# Patient Record
Sex: FEMALE | Race: BLACK OR AFRICAN AMERICAN | Employment: FULL TIME | URBAN - METROPOLITAN AREA
[De-identification: names, ages, dates, MRNs, and addresses within clinical notes are randomized per-mention and may not be internally consistent; named-entity substitution may affect disease eponyms.]

---

## 2023-01-13 ENCOUNTER — OFFICE VISIT (OUTPATIENT)
Dept: FAMILY MEDICINE | Facility: CLINIC | Age: 43
End: 2023-01-13
Payer: COMMERCIAL

## 2023-01-13 VITALS
HEART RATE: 89 BPM | BODY MASS INDEX: 40.98 KG/M2 | SYSTOLIC BLOOD PRESSURE: 130 MMHG | WEIGHT: 246 LBS | TEMPERATURE: 98.3 F | OXYGEN SATURATION: 99 % | DIASTOLIC BLOOD PRESSURE: 78 MMHG | HEIGHT: 65 IN | RESPIRATION RATE: 20 BRPM

## 2023-01-13 DIAGNOSIS — Z11.4 SCREENING FOR HIV (HUMAN IMMUNODEFICIENCY VIRUS): ICD-10-CM

## 2023-01-13 DIAGNOSIS — Z76.89 ENCOUNTER TO ESTABLISH CARE: Primary | ICD-10-CM

## 2023-01-13 DIAGNOSIS — K21.9 GASTROESOPHAGEAL REFLUX DISEASE WITHOUT ESOPHAGITIS: ICD-10-CM

## 2023-01-13 DIAGNOSIS — N93.9 VAGINAL SPOTTING: ICD-10-CM

## 2023-01-13 DIAGNOSIS — B96.89 BV (BACTERIAL VAGINOSIS): ICD-10-CM

## 2023-01-13 DIAGNOSIS — R35.89 POLYURIA: ICD-10-CM

## 2023-01-13 DIAGNOSIS — Z13.1 SCREENING FOR DIABETES MELLITUS: ICD-10-CM

## 2023-01-13 DIAGNOSIS — Z11.59 NEED FOR HEPATITIS C SCREENING TEST: ICD-10-CM

## 2023-01-13 DIAGNOSIS — Z11.3 SCREEN FOR STD (SEXUALLY TRANSMITTED DISEASE): ICD-10-CM

## 2023-01-13 DIAGNOSIS — Z12.4 SCREENING FOR CERVICAL CANCER: ICD-10-CM

## 2023-01-13 DIAGNOSIS — N76.0 BV (BACTERIAL VAGINOSIS): ICD-10-CM

## 2023-01-13 DIAGNOSIS — N89.8 VAGINAL DISCHARGE: ICD-10-CM

## 2023-01-13 DIAGNOSIS — Z13.220 SCREENING FOR HYPERLIPIDEMIA: ICD-10-CM

## 2023-01-13 DIAGNOSIS — Z12.31 ENCOUNTER FOR SCREENING MAMMOGRAM FOR BREAST CANCER: ICD-10-CM

## 2023-01-13 LAB
ANION GAP SERPL CALCULATED.3IONS-SCNC: 14 MMOL/L (ref 7–15)
BUN SERPL-MCNC: 8.6 MG/DL (ref 6–20)
CALCIUM SERPL-MCNC: 10 MG/DL (ref 8.6–10)
CHLORIDE SERPL-SCNC: 104 MMOL/L (ref 98–107)
CHOLEST SERPL-MCNC: 249 MG/DL
CLUE CELLS: PRESENT
CREAT SERPL-MCNC: 0.79 MG/DL (ref 0.51–0.95)
DEPRECATED HCO3 PLAS-SCNC: 21 MMOL/L (ref 22–29)
GFR SERPL CREATININE-BSD FRML MDRD: >90 ML/MIN/1.73M2
GLUCOSE SERPL-MCNC: 100 MG/DL (ref 70–99)
HBA1C MFR BLD: 5.3 % (ref 0–5.6)
HCV AB SERPL QL IA: NONREACTIVE
HDLC SERPL-MCNC: 65 MG/DL
HIV 1+2 AB+HIV1 P24 AG SERPL QL IA: NONREACTIVE
LDLC SERPL CALC-MCNC: 171 MG/DL
NONHDLC SERPL-MCNC: 184 MG/DL
POTASSIUM SERPL-SCNC: 4.4 MMOL/L (ref 3.4–5.3)
SODIUM SERPL-SCNC: 139 MMOL/L (ref 136–145)
TRICHOMONAS, WET PREP: ABNORMAL
TRIGL SERPL-MCNC: 63 MG/DL
WBC'S/HIGH POWER FIELD, WET PREP: ABNORMAL
YEAST, WET PREP: ABNORMAL

## 2023-01-13 PROCEDURE — 83036 HEMOGLOBIN GLYCOSYLATED A1C: CPT

## 2023-01-13 PROCEDURE — 87591 N.GONORRHOEAE DNA AMP PROB: CPT

## 2023-01-13 PROCEDURE — 87389 HIV-1 AG W/HIV-1&-2 AB AG IA: CPT

## 2023-01-13 PROCEDURE — 99203 OFFICE O/P NEW LOW 30 MIN: CPT

## 2023-01-13 PROCEDURE — 87491 CHLMYD TRACH DNA AMP PROBE: CPT

## 2023-01-13 PROCEDURE — 36415 COLL VENOUS BLD VENIPUNCTURE: CPT

## 2023-01-13 PROCEDURE — 80061 LIPID PANEL: CPT

## 2023-01-13 PROCEDURE — 86803 HEPATITIS C AB TEST: CPT

## 2023-01-13 PROCEDURE — 87624 HPV HI-RISK TYP POOLED RSLT: CPT

## 2023-01-13 PROCEDURE — 87210 SMEAR WET MOUNT SALINE/INK: CPT

## 2023-01-13 PROCEDURE — G0145 SCR C/V CYTO,THINLAYER,RESCR: HCPCS

## 2023-01-13 PROCEDURE — 80048 BASIC METABOLIC PNL TOTAL CA: CPT

## 2023-01-13 RX ORDER — AMOXICILLIN 500 MG/1
CAPSULE ORAL
COMMUNITY
Start: 2022-12-07

## 2023-01-13 RX ORDER — METRONIDAZOLE 500 MG/1
500 TABLET ORAL 2 TIMES DAILY
Qty: 14 TABLET | Refills: 0 | Status: SHIPPED | OUTPATIENT
Start: 2023-01-13 | End: 2023-01-20

## 2023-01-13 ASSESSMENT — ENCOUNTER SYMPTOMS
NERVOUS/ANXIOUS: 0
COUGH: 0
PARESTHESIAS: 0
WEAKNESS: 0
CHILLS: 0
DIZZINESS: 0
JOINT SWELLING: 0
HEMATOCHEZIA: 0
DIARRHEA: 0
ABDOMINAL PAIN: 0
FREQUENCY: 1
MYALGIAS: 0
SHORTNESS OF BREATH: 0
BREAST MASS: 0
HEADACHES: 0
HEARTBURN: 1
EYE PAIN: 0
NAUSEA: 0
PALPITATIONS: 0
FEVER: 0
HEMATURIA: 0
ARTHRALGIAS: 0
DYSURIA: 0
SORE THROAT: 0
CONSTIPATION: 0

## 2023-01-13 ASSESSMENT — PAIN SCALES - GENERAL: PAINLEVEL: NO PAIN (0)

## 2023-01-13 NOTE — PROGRESS NOTES
Assessment & Plan     Encounter to establish care  Patient presents to establish care after her previously living in [place].  We discussed recommended screenings today, previous health status, family history, and social history.  No previous medical diagnoses, and does not take any meds.  Currently on amoxicillin for a tooth infection that she is to have removed in 2 weeks.  This is doing much better since starting on the amoxicillin.  - Basic metabolic panel  (Ca, Cl, CO2, Creat, Gluc, K, Na, BUN)    Vaginal discharge  BV (bacterial vaginosis)  Patient reports increased white thin discharge.  Wet prep was completed today.  It does show clue cells.  Will treat for BV.  Discussed with patient to take this medication for the whole course and as prescribed.  She can take it with yogurt or probiotic to drain decrease the risk of diarrhea.  - Wet prep - Clinic Collect  - metroNIDAZOLE (FLAGYL) 500 MG tablet; Take 1 tablet (500 mg) by mouth 2 times daily for 7 days  Dispense: 14 tablet; Refill: 0    Screen for STD (sexually transmitted disease)  Patient request STD screening with her Pap smear today.  Collected.  - Chlamydia & Gonorrhea by PCR, [facility] - Clinic Collect    Screening for cervical cancer  Pap screen completed today.  Will order with HPV per her age recommendations.  She does not remember ever having anyone tell her she had a history of an abnormal Pap.  There was some blood with swabbing today, but patient reports she is just finishing up her menses.  No bleeding with scraping of the cervix.  - Pap screen with HPV - recommended age 30 - 65 years    Screening for diabetes mellitus  Polyuria  Patient would like screening for diabetes.  Will complete today.  She does have an increased BMI and notes that she thinks she was told she had high cholesterol in the past.  We will treat if elevated to the level of diabetes, and has discussed with patient about lifestyle changes versus medications if prediabetes.  -  Basic metabolic panel  (Ca, Cl, CO2, Creat, Gluc, K, Na, BUN)  - Hemoglobin A1c; 5.3, no intervention needed.    Screening for HIV (human immunodeficiency virus)  Discussed recommended screening with patient.  She agrees.  Ordered.  - HIV Antigen Antibody Combo    Screening for hyperlipidemia  Patient does think her previous PCP told her her cholesterol was high and wanted to treat.  We will recheck today.  And treat as appropriate.  - Lipid Profile (Chol, Trig, HDL, LDL calc)    Recent Labs   Lab Test 01/13/23  1209   CHOL 249*   HDL 65   *   TRIG 63     The 10-year ASCVD risk score (Jarvis CONNELL, et al., 2019) is: 0.9%    Values used to calculate the score:      Age: 42 years      Sex: Female      Is Non- : No      Diabetic: No      Tobacco smoker: No      Systolic Blood Pressure: 130 mmHg      Is BP treated: No      HDL Cholesterol: 65 mg/dL      Total Cholesterol: 249 mg/dL    Need for hepatitis C screening test  Discussed recommended screening. Patient agrees. Ordered.   - Hepatitis C Screen Reflex to HCV RNA Quant and Genotype    BMI 40.0-44.9, adult (H)  We discussed today that patient is doing a food journal and trying to lose weight with a group of her friends.  She has been trying to eat healthier and exercising.  She tells me that they have a $450 pool going on to who can lose the most weight before March.  She is very motivated to try and lose weight because of this.    Encounter for screening mammogram for breast cancer  Patient is due for mammogram per her reports.  Ordered.  - *MA Screening Digital Bilateral; Future    12. Vaginal spotting  Patient describes vaginal spotting consistently before.  Discussed with her that agree with her previous PCP that we will just continue to monitor this.  If she has spotting that is not typical and predictable would want her to be evaluated.  If she has any abdominal fullness, pelvic pain, or ovarian pain I would want her to be evaluated  "as well.  She verbalized understanding.     13. Gastroesophageal reflux disease without esophagitis  Patient suffering from heartburn, relieved by Tums.  We discussed lifestyle management with this including watching which foods cause it and she could take an omeprazole prior to prevent it, or she continue just to use Tums as needed.  Discussed not eating within 2 hours of bedtime, and not laying down immediately after eating.  As mentioned in HPI she has stopped eating after 7 PM and has had significant decrease in the amount of heartburn she is having.    BMI:   Estimated body mass index is 40.94 kg/m  as calculated from the following:    Height as of this encounter: 1.651 m (5' 5\").    Weight as of this encounter: 111.6 kg (246 lb).   Weight management plan: Discussed healthy diet and exercise guidelines    Return in about 1 year (around 1/13/2024) for Routine preventive. Or sooner if needed.     Darek Barraza is a 42 year old, presenting for the following health issues:  Physical (Establish care/ PAP )    Patient reports she moved here about 7 months ago from [place] and needs to establish care.  She does not take any medications, but is currently on amoxicillin for tooth infection.  This is scheduled to be removed in about 2 weeks.  The pain has improved and she is feeling better since starting the amoxicillin.    She does note some increased vaginal discharge.  No abnormal vaginal bleeding, but she does report she always has spotting before her period after her tubal ligation. This has been chronic and is regular for her.  Previous PCP told her just to continue to monitor.  She is sexually active and would like STD screening.  She is not having any pelvic pain.  No lower back pain.  No fevers.    She does not smoke, but she did previously quit about 10 years ago.  She drinks about 1 liquor drink every other weekend.  She is trying to eat healthier and is keeping a food journal and exercising to try and " lose weight.  Her and a few friends have a pool going on who can lose most weight before March.  She has given up soda since the first of the year and is doing well.  Reports she used to drink this consistently throughout the day.  She has 3 daughters.  She is not in a relationship, but currently only sexually active with 1 male partner.  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  She likes her job.    She notes she does have heartburn in the evenings and has to take Tums.  She is very pleased with the fact that since she has been keeping a food journal and try not to eat after 7 and the heartburn has improved immensely.    Healthy Habits:     Getting at least 3 servings of Calcium per day:  Yes    Bi-annual eye exam:  NO    Dental care twice a year:  Yes    Sleep apnea or symptoms of sleep apnea:  Excessive snoring    Diet:  Regular (no restrictions)    Frequency of exercise:  1 day/week    Duration of exercise:  15-30 minutes    Taking medications regularly:  Yes    Medication side effects:  None    PHQ-2 Total Score: 0    Additional concerns today:  Yes     Review of Systems   Constitutional: Negative for chills and fever.   HENT: Negative for congestion, ear pain, hearing loss and sore throat.    Eyes: Negative for pain and visual disturbance.   Respiratory: Negative for cough and shortness of breath.    Cardiovascular: Negative for chest pain, palpitations and peripheral edema.   Gastrointestinal: Positive for heartburn. Negative for abdominal pain, constipation, diarrhea, hematochezia and nausea.   Breasts:  Negative for tenderness, breast mass and discharge.   Genitourinary: Positive for frequency, vaginal bleeding and vaginal discharge. Negative for dysuria, genital sores, hematuria, pelvic pain and urgency.   Musculoskeletal: Negative for arthralgias, joint swelling and myalgias.   Skin: Negative for rash.   Neurological: Negative for dizziness, weakness, headaches and paresthesias.   Psychiatric/Behavioral:  "Negative for mood changes. The patient is not nervous/anxious.           Objective    /78 (BP Location: Right arm, Patient Position: Right side)   Pulse 89   Temp 98.3  F (36.8  C) (Oral)   Resp 20   Ht 1.651 m (5' 5\")   Wt 111.6 kg (246 lb)   LMP 01/08/2023 (Exact Date)   SpO2 99%   BMI 40.94 kg/m    Body mass index is 40.94 kg/m .  Physical Exam  Constitutional:       General: She is not in acute distress.  HENT:      Right Ear: Tympanic membrane, ear canal and external ear normal.      Left Ear: Tympanic membrane, ear canal and external ear normal.   Eyes:      Extraocular Movements: Extraocular movements intact.      Pupils: Pupils are equal, round, and reactive to light.   Cardiovascular:      Rate and Rhythm: Normal rate and regular rhythm.      Pulses: Normal pulses.      Heart sounds: Normal heart sounds. No murmur heard.  Pulmonary:      Effort: Pulmonary effort is normal. No respiratory distress.      Breath sounds: Normal breath sounds. No wheezing.   Abdominal:      General: Abdomen is flat. Bowel sounds are normal.      Palpations: Abdomen is soft.      Hernia: There is no hernia in the left inguinal area or right inguinal area.   Genitourinary:     Exam position: Lithotomy position.      Labia:         Right: No tenderness, lesion or injury.         Left: No tenderness, lesion or injury.       Urethra: No prolapse, urethral pain or urethral swelling.      Vagina: Vaginal discharge present. No erythema, tenderness, bleeding or lesions.      Cervix: No cervical motion tenderness, discharge, friability, lesion, erythema or cervical bleeding.      Uterus: Not deviated.       Adnexa:         Right: No mass, tenderness or fullness.          Left: No mass, tenderness or fullness.        Comments: White drainage present, scant red blood on swabs, no obvious signs of bleeding.   Musculoskeletal:         General: Normal range of motion.      Cervical back: Normal range of motion. No rigidity or " tenderness.      Right lower leg: No edema.      Left lower leg: No edema.   Lymphadenopathy:      Cervical: No cervical adenopathy.      Lower Body: No right inguinal adenopathy. No left inguinal adenopathy.   Neurological:      General: No focal deficit present.      Mental Status: She is alert.      Cranial Nerves: No cranial nerve deficit.      Motor: No weakness.      Gait: Gait normal.   Psychiatric:         Mood and Affect: Mood normal.         Thought Content: Thought content normal.        At the end of the visit, I confirmed understanding of what was discussed. Patient has no further questions or concerns that were brought up at this time.     Aracelis Wilburn, OLEG, APRN, FNP-C

## 2023-01-14 LAB
C TRACH DNA SPEC QL PROBE+SIG AMP: NEGATIVE
N GONORRHOEA DNA SPEC QL NAA+PROBE: NEGATIVE

## 2023-01-18 LAB
BKR LAB AP GYN ADEQUACY: NORMAL
BKR LAB AP GYN INTERPRETATION: NORMAL
BKR LAB AP HPV REFLEX: NORMAL
BKR LAB AP LMP: NORMAL
BKR LAB AP PREVIOUS ABNORMAL: NORMAL
PATH REPORT.COMMENTS IMP SPEC: NORMAL
PATH REPORT.COMMENTS IMP SPEC: NORMAL
PATH REPORT.RELEVANT HX SPEC: NORMAL

## 2023-01-19 LAB
HUMAN PAPILLOMA VIRUS 16 DNA: NEGATIVE
HUMAN PAPILLOMA VIRUS 18 DNA: NEGATIVE
HUMAN PAPILLOMA VIRUS FINAL DIAGNOSIS: NORMAL
HUMAN PAPILLOMA VIRUS OTHER HR: NEGATIVE

## 2023-01-23 ENCOUNTER — ANCILLARY PROCEDURE (OUTPATIENT)
Dept: MAMMOGRAPHY | Facility: HOSPITAL | Age: 43
End: 2023-01-23
Payer: COMMERCIAL

## 2023-01-23 DIAGNOSIS — Z12.31 ENCOUNTER FOR SCREENING MAMMOGRAM FOR BREAST CANCER: ICD-10-CM

## 2023-01-23 PROCEDURE — 77067 SCR MAMMO BI INCL CAD: CPT

## 2023-02-06 ENCOUNTER — ANCILLARY PROCEDURE (OUTPATIENT)
Dept: MAMMOGRAPHY | Facility: CLINIC | Age: 43
End: 2023-02-06
Payer: COMMERCIAL

## 2023-02-06 DIAGNOSIS — N64.89 BREAST ASYMMETRY: ICD-10-CM

## 2023-02-06 PROCEDURE — 76642 ULTRASOUND BREAST LIMITED: CPT | Mod: LT

## 2023-02-06 PROCEDURE — 77061 BREAST TOMOSYNTHESIS UNI: CPT | Mod: LT

## 2023-02-12 ENCOUNTER — HEALTH MAINTENANCE LETTER (OUTPATIENT)
Age: 43
End: 2023-02-12

## 2024-01-23 ENCOUNTER — HOSPITAL ENCOUNTER (OUTPATIENT)
Dept: GENERAL RADIOLOGY | Facility: HOSPITAL | Age: 44
Discharge: HOME | End: 2024-01-23
Attending: PHYSICIAN ASSISTANT | Admitting: PHYSICIAN ASSISTANT
Payer: COMMERCIAL

## 2024-01-23 ENCOUNTER — OFFICE VISIT (OUTPATIENT)
Dept: FAMILY MEDICINE | Facility: CLINIC | Age: 44
End: 2024-01-23
Payer: COMMERCIAL

## 2024-01-23 VITALS
HEART RATE: 98 BPM | RESPIRATION RATE: 18 BRPM | TEMPERATURE: 98.1 F | BODY MASS INDEX: 38.26 KG/M2 | SYSTOLIC BLOOD PRESSURE: 117 MMHG | WEIGHT: 229.9 LBS | DIASTOLIC BLOOD PRESSURE: 81 MMHG | OXYGEN SATURATION: 100 %

## 2024-01-23 DIAGNOSIS — R05.2 SUBACUTE COUGH: ICD-10-CM

## 2024-01-23 DIAGNOSIS — R05.2 SUBACUTE COUGH: Primary | ICD-10-CM

## 2024-01-23 DIAGNOSIS — R91.8 PULMONARY NODULES: ICD-10-CM

## 2024-01-23 PROCEDURE — 71046 X-RAY EXAM CHEST 2 VIEWS: CPT

## 2024-01-23 PROCEDURE — 99214 OFFICE O/P EST MOD 30 MIN: CPT | Performed by: PHYSICIAN ASSISTANT

## 2024-01-23 RX ORDER — BENZONATATE 100 MG/1
100 CAPSULE ORAL 3 TIMES DAILY PRN
Qty: 21 CAPSULE | Refills: 0 | Status: SHIPPED | OUTPATIENT
Start: 2024-01-23

## 2024-01-23 RX ORDER — CODEINE PHOSPHATE/GUAIFENESIN 10-100MG/5
10 LIQUID (ML) ORAL
Qty: 70 ML | Refills: 0 | Status: SHIPPED | OUTPATIENT
Start: 2024-01-23

## 2024-01-23 ASSESSMENT — ENCOUNTER SYMPTOMS
SINUS PAIN: 0
FEVER: 0
CHEST TIGHTNESS: 1
COUGH: 1
SORE THROAT: 0
SHORTNESS OF BREATH: 0
RHINORRHEA: 1

## 2024-01-23 NOTE — PATIENT INSTRUCTIONS
There were no signs of pneumonia.    I have prescribed a night time cough syrup. Avoid taking the PM dose of Mucinex while you're taking this syrup.    May take Tessalon Perles as needed for cough.    Please monitor symptoms carefully.  If developing chest pain, shortness of breath, fever, coughing up blood, extreme fatigue, or any other new, concerning symptoms, come back to clinic or go to ER immediately.  Otherwise, if no improvement in symptoms in one week, follow-up with your primary care provider.

## 2024-01-23 NOTE — PROGRESS NOTES
Patient presents with:  Cough: 2 months cough with mucus,chest tightness,sob and runny nose.      Clinical Decision Making:  Patient pains and cough the past 2 months.  Chest x-ray does not exhibit any causes for the cough.  Lungs are currently CTA.  Low suspicion for RAD or bronchitis.  Suspect postviral syndrome.  Focus was sent on cough suppression.  Patient started on Tessalon Perles and guaifenesin AC for comfort.  Incidental single pulmonary nodule noted.  This is out of the patient's problem list.  It is not at a concerning size for malignancy.      ICD-10-CM    1. Subacute cough  R05.2 XR Chest 2 Views     benzonatate (TESSALON) 100 MG capsule     guaiFENesin-codeine (GUAIFENESIN AC) 100-10 MG/5ML syrup      2. Pulmonary nodules  R91.8           Patient Instructions   There were no signs of pneumonia.    I have prescribed a night time cough syrup. Avoid taking the PM dose of Mucinex while you're taking this syrup.    May take Tessalon Perles as needed for cough.    Please monitor symptoms carefully.  If developing chest pain, shortness of breath, fever, coughing up blood, extreme fatigue, or any other new, concerning symptoms, come back to clinic or go to ER immediately.  Otherwise, if no improvement in symptoms in one week, follow-up with your primary care provider.      HPI:  Christi Sepulveda is a 43 year old female who presents today complaining of productive cough x 2 months. Patient reporting chest tightness and shortness of breath.  No recent fevers.  She has had some postnasal drainage.  She has been taking Mucinex and an antihistamine medication.  She started the antihistamine when the cough began because she thought maybe she was allergic to the cat at her work.  She has not noticed any improvement with symptoms from the antihistamine.  She denies any history of lung disease.  She has approximately a 3-pack-year history and quit smoking about 15 years ago.  Patient denies any sinus pain or  pressure.    History obtained from the patient.    Problem List:  2024: Pulmonary nodules      No past medical history on file.    Social History     Tobacco Use    Smoking status: Former     Packs/day: 0.25     Years: 10.00     Additional pack years: 0.00     Total pack years: 2.50     Types: Cigarettes     Quit date:      Years since quittin.0    Smokeless tobacco: Never   Substance Use Topics    Alcohol use: Yes     Alcohol/week: 1.0 standard drink of alcohol     Types: 1 Shots of liquor per week       Review of Systems   Constitutional:  Negative for fever.   HENT:  Positive for rhinorrhea. Negative for sinus pain and sore throat.    Respiratory:  Positive for cough and chest tightness. Negative for shortness of breath.        Vitals:    24 1034   BP: 117/81   BP Location: Right arm   Patient Position: Sitting   Cuff Size: Adult Large   Pulse: 98   Resp: 18   Temp: 98.1  F (36.7  C)   TempSrc: Oral   SpO2: 100%   Weight: 104.3 kg (229 lb 14.4 oz)       Physical Exam  Vitals and nursing note reviewed.   Constitutional:       General: She is not in acute distress.     Appearance: She is not toxic-appearing or diaphoretic.   HENT:      Head: Normocephalic and atraumatic.      Right Ear: Tympanic membrane, ear canal and external ear normal.      Left Ear: Tympanic membrane, ear canal and external ear normal.      Mouth/Throat:      Mouth: Mucous membranes are moist.      Pharynx: No oropharyngeal exudate or posterior oropharyngeal erythema.   Eyes:      Conjunctiva/sclera: Conjunctivae normal.   Cardiovascular:      Rate and Rhythm: Normal rate and regular rhythm.      Heart sounds: No murmur heard.  Pulmonary:      Effort: Pulmonary effort is normal. No respiratory distress.      Breath sounds: Normal breath sounds. No stridor. No wheezing, rhonchi or rales.   Lymphadenopathy:      Cervical: No cervical adenopathy.   Neurological:      Mental Status: She is alert.   Psychiatric:         Mood and  Affect: Mood normal.         Behavior: Behavior normal.         Thought Content: Thought content normal.         Judgment: Judgment normal.         Results:  I have personally ordered and preliminarily reviewed the following xray, I have noted no infiltrates.   Results for orders placed or performed during the hospital encounter of 01/23/24   XR Chest 2 Views     Status: None    Narrative    EXAM: XR CHEST 2 VIEWS  LOCATION: [facility]  DATE: 1/23/2024    INDICATION: Cough x 2 months. Chest tightness. Lungs CTA.  COMPARISON: None.      Impression    IMPRESSION: Calcified granulomata in the lungs with calcified left hilar nodes. Normal heart size. No pulmonary infiltrates. Staple within the left breast.         At the end of the encounter, I discussed results, diagnosis, medications. Discussed red flags for immediate return to clinic/ER, as well as indications for follow up if no improvement. Patient understood and agreed to plan. Patient was stable for discharge.

## 2024-03-10 ENCOUNTER — HEALTH MAINTENANCE LETTER (OUTPATIENT)
Age: 44
End: 2024-03-10

## 2025-01-07 ENCOUNTER — PATIENT OUTREACH (OUTPATIENT)
Dept: CARE COORDINATION | Facility: CLINIC | Age: 45
End: 2025-01-07
Payer: COMMERCIAL

## 2025-02-04 ENCOUNTER — PATIENT OUTREACH (OUTPATIENT)
Dept: CARE COORDINATION | Facility: CLINIC | Age: 45
End: 2025-02-04
Payer: COMMERCIAL

## 2025-03-16 ENCOUNTER — HEALTH MAINTENANCE LETTER (OUTPATIENT)
Age: 45
End: 2025-03-16

## 2025-04-06 ENCOUNTER — HEALTH MAINTENANCE LETTER (OUTPATIENT)
Age: 45
End: 2025-04-06

## 2025-08-05 ENCOUNTER — PATIENT OUTREACH (OUTPATIENT)
Dept: CARE COORDINATION | Facility: CLINIC | Age: 45
End: 2025-08-05
Payer: COMMERCIAL